# Patient Record
(demographics unavailable — no encounter records)

---

## 2025-04-21 NOTE — CONSULT LETTER
[Dear  ___] : Dear  [unfilled], [Consult Letter:] : I had the pleasure of evaluating your patient, [unfilled]. [Please see my note below.] : Please see my note below. [Consult Closing:] : Thank you very much for allowing me to participate in the care of this patient.  If you have any questions, please do not hesitate to contact me. [Sincerely,] : Sincerely, [FreeTextEntry3] : Rafi Pratt MD Division of Pediatric Gastroenterology [facility]

## 2025-04-21 NOTE — PHYSICAL EXAM
[Well Developed] : well developed [NAD] : in no acute distress [PERRL] : pupils were equal, round, reactive to light  [icteric] : anicteric [Moist & Pink Mucous Membranes] : moist and pink mucous membranes [CTAB] : lungs clear to auscultation bilaterally [Respiratory Distress] : no respiratory distress  [Regular Rate and Rhythm] : regular rate and rhythm [Normal S1, S2] : normal S1 and S2 [Distended] : non distended [Tender] : non tender [Normal Bowel Sounds] : normal bowel sounds [No HSM] : no hepatosplenomegaly appreciated [Normal rectal exam] : exam was normal [Stool Sample Obtained] : a stool sample was obtained [Soft] : soft [Normal External Genitalia] : normal external genitalia [Circumcised] : circumcised [Rico Stage ___] : Rico stage [unfilled] [Normal Tone] : normal tone [Well-Perfused] : well-perfused [Edema] : no edema [Cyanosis] : no cyanosis [Rash] : no rash [Jaundice] : no jaundice [Interactive] : interactive [FreeTextEntry1] : Crying with abd pain, tears noted.

## 2025-04-21 NOTE — ASSESSMENT
[FreeTextEntry1] : 7 year old male with crampy abdominal pain that started in early April for which he was evaluated in the ED at [facility] on 2 occasions as well as the PMD office and started on MiraLax, initially with improvement noted and then had a worsening of abdominal pain on April 17, 2025 with vomiting and diarrhea while on Miralax. The vomiting has resolved but abdominal pain persists with return visit to ED at [place] [facility] for mesenteric adenitis.  Most likely infectious but would consider inflammatory etiologies.  Plan: lab assessment stool studies Tylenol prn pain Heating pads fluids Decrease dose of MiraLax if fails to improve, consider further assessment with MRE

## 2025-04-21 NOTE — END OF VISIT
[Time Spent: ___ minutes] : I have spent [unfilled] minutes of time on the encounter which excludes teaching and separately reported services. spouse

## 2025-04-21 NOTE — HISTORY OF PRESENT ILLNESS
[de-identified] : 7 year old male with abdominal pain that started in early April 2025. Went to ED at [place] on April 4, 2025 after 2 days of abd pain. Eval by PMD who sent patient back to ED. After the second visit felt better but not back to baseline. Been to ED at [place] for 3 visits. Then pain started back and had some vomiting and diarrhea.  Brought to ED on April 17, 2025. At that time he had an AXray and abd sono with ?mesenteric adenitis. Wakes at night with abdominal pain. Cont with abdominal pain. [place] diet. BMs 3x/d, [place] 6 or 7 on MiraLax 1 cap/day. No rash, jt pain or fever.  No ill contacts. No recent travel. Prior to this episode he was well, having BMs daily without difficulty.  Family Hx: father with loose stool for years Home schooled. Lives with parents. [de-identified] : MVit, Vit C and MiraLax